# Patient Record
Sex: FEMALE | Race: BLACK OR AFRICAN AMERICAN | Employment: UNEMPLOYED | URBAN - METROPOLITAN AREA
[De-identification: names, ages, dates, MRNs, and addresses within clinical notes are randomized per-mention and may not be internally consistent; named-entity substitution may affect disease eponyms.]

---

## 2017-10-12 ENCOUNTER — TELEPHONE (OUTPATIENT)
Dept: INTERNAL MEDICINE CLINIC | Age: 47
End: 2017-10-12

## 2017-10-12 NOTE — TELEPHONE ENCOUNTER
Nurse calling to report that at 1:00 patients blood pressure was 199/100 today. She took her daily meds at 6:00 am.She seemed to be feeling OK, and told the nurse that it usually runs high when she goes to dialysis, but she didn't do that today. Only on MWF. Also, her right foot is itchy at night, and she itches it until its about raw, and after she does this, she says the bottom of the foot feels numb.       Questions, call nurse Zain at [phone]

## 2017-10-12 NOTE — TELEPHONE ENCOUNTER
[phone]  , patient will be seeing Dr. Jalil Gilmore for the first time on Monday. If any concerns over the weekend they should take her to the ED.

## 2017-10-17 LAB
AVERAGE GLUCOSE: NORMAL
HBA1C MFR BLD: 9.6 %

## 2017-10-19 ENCOUNTER — TELEPHONE (OUTPATIENT)
Dept: INTERNAL MEDICINE CLINIC | Age: 47
End: 2017-10-19

## 2017-11-03 ENCOUNTER — OFFICE VISIT (OUTPATIENT)
Dept: VASCULAR SURGERY | Age: 47
End: 2017-11-03

## 2017-11-03 VITALS
HEIGHT: 60 IN | DIASTOLIC BLOOD PRESSURE: 64 MMHG | BODY MASS INDEX: 43.19 KG/M2 | SYSTOLIC BLOOD PRESSURE: 118 MMHG | WEIGHT: 220 LBS

## 2017-11-03 DIAGNOSIS — N18.6 ESRD (END STAGE RENAL DISEASE) ON DIALYSIS (HCC): Primary | ICD-10-CM

## 2017-11-03 DIAGNOSIS — Z99.2 ESRD (END STAGE RENAL DISEASE) ON DIALYSIS (HCC): Primary | ICD-10-CM

## 2017-11-03 PROCEDURE — 99203 OFFICE O/P NEW LOW 30 MIN: CPT | Performed by: SURGERY

## 2017-11-03 NOTE — PROGRESS NOTES
meals). 6/26/13  Yes Hallie Lara MD   amLODIPine (NORVASC) 10 MG tablet Take 1 tablet by mouth daily. 6/26/13  Yes Hallie Lara MD   lisinopril (PRINIVIL;ZESTRIL) 10 MG tablet Take 10 mg by mouth daily. Yes Historical Provider, MD   clonazePAM (KLONOPIN) 1 MG tablet Take 1 mg by mouth 2 times daily as needed. Yes Historical Provider, MD   aspirin 325 MG tablet Take 325 mg by mouth daily. Yes Historical Provider, MD   sevelamer (RENVELA) 800 MG tablet Take 3 tablets by mouth 3 times daily. Yes Historical Provider, MD   clonidine (CATAPRES) 0.1 MG tablet Take 0.1 mg by mouth 3 times daily. Historical Provider, MD   valsartan (DIOVAN) 80 MG tablet Take 80 mg by mouth 2 times daily. Historical Provider, MD   temazepam (RESTORIL) 30 MG capsule Take 30 mg by mouth nightly as needed. Historical Provider, MD   insulin glargine (LANTUS) 100 UNIT/ML injection Inject 15 Units into the skin nightly. INCREASED DOSE 1/24/12   Gregg Haskins MD        [facility] Meds:    Current Outpatient Prescriptions   Medication Sig Dispense Refill    atorvastatin (LIPITOR) 10 MG tablet 1 tablet by Per NG tube route nightly. 30 tablet 0    carvedilol (COREG) 6.25 MG tablet Take 1 tablet by mouth 2 times daily (with meals). 60 tablet 0    amLODIPine (NORVASC) 10 MG tablet Take 1 tablet by mouth daily. 30 tablet 0    lisinopril (PRINIVIL;ZESTRIL) 10 MG tablet Take 10 mg by mouth daily.  clonazePAM (KLONOPIN) 1 MG tablet Take 1 mg by mouth 2 times daily as needed.  aspirin 325 MG tablet Take 325 mg by mouth daily.  sevelamer (RENVELA) 800 MG tablet Take 3 tablets by mouth 3 times daily.  clonidine (CATAPRES) 0.1 MG tablet Take 0.1 mg by mouth 3 times daily.  valsartan (DIOVAN) 80 MG tablet Take 80 mg by mouth 2 times daily.  temazepam (RESTORIL) 30 MG capsule Take 30 mg by mouth nightly as needed.         insulin glargine (LANTUS) 100 UNIT/ML injection Inject 15 Units into the skin nightly. INCREASED DOSE 2 vial 1     No current facility-administered medications for this visit. Social History:       Social History     Social History    Marital status: Single     Spouse name: N/A    Number of children: N/A    Years of education: N/A     Social History Main Topics    Smoking status: Never Smoker    Smokeless tobacco: Never Used    Alcohol use No    Drug use: No    Sexual activity: Not Asked     Other Topics Concern    None     Social History Narrative    None       Family Histroy:      Family History   Problem Relation Age of Onset    Cancer Mother      ? TYPE    High Blood Pressure Mother     Diabetes Mother     High Blood Pressure Maternal Grandmother     Diabetes Maternal Grandmother     Asthma Other      CHILDREN       Review of Systems:  A 14 point review of systems was completed. Pertinent positives identified in the HPI, all other review of symptoms negative. Physical Exam   Vital Signs: /64   Ht 5' (1.524 m)   Wt 220 lb (99.8 kg)   BMI 42.97 kg/m²        Admission Weight: 220 lb (99.8 kg)     General appearance: alert, appears stated age, cooperative and no distress  Right forearm AV graft with excellent thrill and bruit. Palpable radial pulse. Hand warm. No distal cyanosis or ischemia. No edema noted. Patient's area of concern is slightly tender to palpation. No overlying erythema or aneurysm noted.       Labs:    BMP:   Lab Results   Component Value Date     06/25/2013    K 4.2 06/25/2013    CL 94 06/25/2013    CO2 27 06/25/2013    PHOS 7.1 06/25/2013    BUN 42 06/25/2013    CREATININE 14.3 06/25/2013      No components found for: GLU  Lab Results   Component Value Date    MG 1.6 06/23/2013      CBC:   Lab Results   Component Value Date    WBC 7.6 06/26/2013    HGB 8.7 06/26/2013    HCT 25.4 06/26/2013    MCV 94.2 06/26/2013     06/26/2013      Coagulation:   Lab Results   Component Value Date    INR 1.09

## 2017-12-12 ENCOUNTER — OFFICE VISIT (OUTPATIENT)
Dept: INTERNAL MEDICINE CLINIC | Age: 47
End: 2017-12-12

## 2017-12-12 VITALS
HEART RATE: 78 BPM | DIASTOLIC BLOOD PRESSURE: 80 MMHG | OXYGEN SATURATION: 96 % | BODY MASS INDEX: 42.97 KG/M2 | WEIGHT: 220 LBS | SYSTOLIC BLOOD PRESSURE: 124 MMHG

## 2017-12-12 DIAGNOSIS — I10 ESSENTIAL HYPERTENSION: ICD-10-CM

## 2017-12-12 DIAGNOSIS — I25.10 CORONARY ARTERY DISEASE INVOLVING NATIVE CORONARY ARTERY OF NATIVE HEART WITHOUT ANGINA PECTORIS: ICD-10-CM

## 2017-12-12 DIAGNOSIS — Z00.00 WELL ADULT EXAM: Primary | ICD-10-CM

## 2017-12-12 DIAGNOSIS — E78.49 OTHER HYPERLIPIDEMIA: ICD-10-CM

## 2017-12-12 DIAGNOSIS — N18.6 ESRD (END STAGE RENAL DISEASE) (HCC): ICD-10-CM

## 2017-12-12 DIAGNOSIS — Z23 NEED FOR PROPHYLACTIC VACCINATION AND INOCULATION AGAINST INFLUENZA: ICD-10-CM

## 2017-12-12 PROCEDURE — 90688 IIV4 VACCINE SPLT 0.5 ML IM: CPT | Performed by: INTERNAL MEDICINE

## 2017-12-12 PROCEDURE — 99386 PREV VISIT NEW AGE 40-64: CPT | Performed by: INTERNAL MEDICINE

## 2017-12-12 PROCEDURE — G0008 ADMIN INFLUENZA VIRUS VAC: HCPCS | Performed by: INTERNAL MEDICINE

## 2017-12-12 ASSESSMENT — ENCOUNTER SYMPTOMS
DIARRHEA: 0
COUGH: 0
WHEEZING: 0
NAUSEA: 0
CHEST TIGHTNESS: 0
BACK PAIN: 1
SHORTNESS OF BREATH: 0
CONSTIPATION: 0
ABDOMINAL DISTENTION: 0
VOMITING: 0

## 2017-12-12 NOTE — PROGRESS NOTES
Vaccine Information Sheet, \"Influenza - Inactivated\"  given to Paulina Queen, or parent/legal guardian of  Paulina Queen and verbalized understanding. Patient responses:    Have you ever had a reaction to a flu vaccine? No  Are you able to eat eggs without adverse effects? Yes  Do you have any current illness? No  Have you ever had Guillian Alanson Syndrome? No    Flu vaccine given per order. Please see immunization tab.

## 2017-12-12 NOTE — PROGRESS NOTES
Subjective:      Patient ID: Jorge Barlow is a 52 y.o. female. HPI    Here to establish care and for a well exam. Past Medical History, Medications, Social History, Family History, Health Maintenance  have been reviewed with Jorge Barlow. States her blood sugars have been elevated due to being on prednisone. Followed by Cardiology. Takes cholesterol medication in the evening. htn has improved since starting bp medication. Pt is on transplant list for a kidney. Currently getting HD Monday, Wednesday and Friday        Review of Systems   Constitutional: Negative for unexpected weight change. Respiratory: Negative for cough, chest tightness, shortness of breath and wheezing. Cardiovascular: Negative for chest pain, palpitations and leg swelling. Gastrointestinal: Negative for abdominal distention, constipation, diarrhea, nausea and vomiting. Musculoskeletal: Positive for back pain. Negative for arthralgias and myalgias. Neurological: Negative for tremors and numbness. All other systems reviewed and are negative. Objective:   Physical Exam   Constitutional: She is oriented to person, place, and time. She appears well-developed and well-nourished. No distress. obese   HENT:   Right Ear: External ear normal.   Left Ear: External ear normal.   Nose: Nose normal.   Mouth/Throat: Oropharynx is clear and moist. No oropharyngeal exudate. Neck: Normal range of motion. Neck supple. No thyromegaly present. Cardiovascular: Normal rate, regular rhythm, normal heart sounds and intact distal pulses. Exam reveals no gallop and no friction rub. No murmur heard. Pulmonary/Chest: Effort normal and breath sounds normal. No respiratory distress. She has no wheezes. She has no rales. She exhibits no tenderness. Abdominal: Soft. She exhibits no distension and no mass. There is no tenderness. There is no rebound and no guarding. Musculoskeletal: She exhibits no edema.    Neurological: She is alert and oriented to person, place, and time. Skin: She is not diaphoretic. Psychiatric: She has a normal mood and affect. Her behavior is normal. Judgment and thought content normal.   Vitals reviewed. Assessment:      Mary Vizcarra was seen today for new patient. Diagnoses and all orders for this visit:    Well adult exam  Encouraged exercise and healthy diet. F/u with ob/gyn and dentist regularly. Recommend eye exam.  Wears seat belt. Provided rx for fasting labs. Continue medications. Coronary artery disease involving native coronary artery of native heart without angina pectoris  Stable. F/u with Cardiology    DM (diabetes mellitus), secondary uncontrolled ([facility] [address]  Control unknown. Continue current regimen  -     Hemoglobin A1C; Future    Essential hypertension  Stable. Continue current regimen  -     Comprehensive Metabolic Panel; Future    Other hyperlipidemia  Control unknown. Obtain labs. Continue current regimen  -     Lipid Panel; Future  -     Comprehensive Metabolic Panel;  Future    ESRD (end stage renal disease) ([facility] [address]  On transplant list. Continue HD     Need for prophylactic vaccination and inoculation against influenza  -     INFLUENZA, QUADV, 3 YRS AND OLDER, IM, MDV, 0.5ML ([facility])          Plan:      See above plan

## 2017-12-18 ENCOUNTER — TELEPHONE (OUTPATIENT)
Dept: INTERNAL MEDICINE CLINIC | Age: 47
End: 2017-12-18

## 2017-12-18 NOTE — TELEPHONE ENCOUNTER
[facility] faxed several orders to our office to be signed today - asks to please send back asap

## 2017-12-20 NOTE — TELEPHONE ENCOUNTER
Kenton Gonzáles calling back to say that she faxed the forms over again yesterday to [facility],  and asking if we received them and if they could please be faxed back.

## 2017-12-22 ENCOUNTER — TELEPHONE (OUTPATIENT)
Dept: INTERNAL MEDICINE CLINIC | Age: 47
End: 2017-12-22

## 2017-12-22 NOTE — TELEPHONE ENCOUNTER
Patient called back and I explained the difference between an Office visit and a pre op, and told her that she will need to make an appt for a pre op. Unfortunately, the surgery will have to be rescheduled because its scheduled for next Tuesday, and no time to have a pre op before then. She will call her surgeon to reschedule and will call us back once she has a new surgery date.

## 2018-02-08 ENCOUNTER — TELEPHONE (OUTPATIENT)
Dept: INTERNAL MEDICINE CLINIC | Age: 48
End: 2018-02-08

## 2018-02-08 NOTE — TELEPHONE ENCOUNTER
Home care nurse saw patient at 3:00 today, and her blood pressure was 203/99 - patient said that she had not taken her medication yet - she forgot. She denied dizziness, headaches, blurry vision, etc... And vitals were all stable. Nurse checked it 30 minutes later , and it was 199/100. Patient has dialysis tomorrow, so it will be checked there again.   Questions,  Call nurse Pittman back at  [phone]

## 2018-02-13 ENCOUNTER — TELEPHONE (OUTPATIENT)
Dept: INTERNAL MEDICINE CLINIC | Age: 48
End: 2018-02-13

## 2018-02-13 RX ORDER — SULFAMETHOXAZOLE AND TRIMETHOPRIM 800; 160 MG/1; MG/1
1 TABLET ORAL 2 TIMES DAILY
Qty: 20 TABLET | Refills: 0 | Status: SHIPPED | OUTPATIENT
Start: 2018-02-13 | End: 2018-02-23

## 2018-02-13 NOTE — TELEPHONE ENCOUNTER
[facility], [address] [facility], calling to have Dr. Lilian Spatz call an antibiotic to [facility] [place]. Jonathan Prabhakar had a boil under left breast which has now broken open and oozing a whitish drainage. Had not taken her blood pressure medication as yet therefore it was a bit high but asymptomatic.     Call Zain if any questions -[phone]

## 2018-02-19 ENCOUNTER — TELEPHONE (OUTPATIENT)
Dept: INTERNAL MEDICINE CLINIC | Age: 48
End: 2018-02-19

## 2018-02-19 DIAGNOSIS — L02.91 ABSCESS: Primary | ICD-10-CM

## 2018-02-19 RX ORDER — TRAMADOL HYDROCHLORIDE 50 MG/1
50 TABLET ORAL EVERY 6 HOURS PRN
Qty: 12 TABLET | Refills: 0 | Status: SHIPPED | OUTPATIENT
Start: 2018-02-19 | End: 2018-02-23 | Stop reason: SDUPTHER

## 2018-02-19 NOTE — TELEPHONE ENCOUNTER
Called [facility] to see if she was able to fax us information on that visit.  There was no answer lm for her to return call

## 2018-02-22 ENCOUNTER — TELEPHONE (OUTPATIENT)
Dept: INTERNAL MEDICINE CLINIC | Age: 48
End: 2018-02-22

## 2018-02-22 NOTE — TELEPHONE ENCOUNTER
Patient notified that she needs to be seen for an appt tomorrow per dr Goel Client.  She is going to call transportation and see if they can bring her she will call and let us know

## 2018-02-23 ENCOUNTER — OFFICE VISIT (OUTPATIENT)
Dept: INTERNAL MEDICINE CLINIC | Age: 48
End: 2018-02-23

## 2018-02-23 VITALS
DIASTOLIC BLOOD PRESSURE: 70 MMHG | HEART RATE: 101 BPM | BODY MASS INDEX: 42.18 KG/M2 | WEIGHT: 216 LBS | OXYGEN SATURATION: 93 % | SYSTOLIC BLOOD PRESSURE: 144 MMHG | TEMPERATURE: 99.7 F

## 2018-02-23 DIAGNOSIS — N18.6 ESRD (END STAGE RENAL DISEASE) (HCC): ICD-10-CM

## 2018-02-23 DIAGNOSIS — L02.91 ABSCESS: Primary | ICD-10-CM

## 2018-02-23 DIAGNOSIS — I10 ESSENTIAL HYPERTENSION: ICD-10-CM

## 2018-02-23 DIAGNOSIS — R52 PAIN: ICD-10-CM

## 2018-02-23 PROCEDURE — 99214 OFFICE O/P EST MOD 30 MIN: CPT | Performed by: INTERNAL MEDICINE

## 2018-02-23 RX ORDER — TRAMADOL HYDROCHLORIDE 50 MG/1
50 TABLET ORAL EVERY 6 HOURS PRN
Qty: 12 TABLET | Refills: 0 | Status: SHIPPED | OUTPATIENT
Start: 2018-02-23 | End: 2018-03-07 | Stop reason: SDUPTHER

## 2018-02-23 NOTE — PROGRESS NOTES
kg), SpO2 93 %, not currently breastfeeding. Assessment:     1. Left breast abscess as noted above. 2.  Intolerance to Bactrim  3. DM 2: Baseline  4. HTN: Borderline elevated  5. End-stage renal disease on dialysis: Baseline           Plan:      1. Refilled tramadol p.r.n. for pain prescription printed at her request  2. Rifampin 300 mg 1 p.o. b.i.d. prescription emailed her request  3. Continue with Flagyl  4.   Follow-up with Dr. Carmelo Ott  I spent greater than 25 minutes with this patient face-to-face with greater than 50% involved counseling and care coordination as mentioned above  Dr. pablo

## 2018-03-07 ENCOUNTER — TELEPHONE (OUTPATIENT)
Dept: INTERNAL MEDICINE CLINIC | Age: 48
End: 2018-03-07

## 2018-03-07 DIAGNOSIS — R52 PAIN: ICD-10-CM

## 2018-03-07 DIAGNOSIS — L02.91 ABSCESS: ICD-10-CM

## 2018-03-07 RX ORDER — TRAMADOL HYDROCHLORIDE 50 MG/1
50 TABLET ORAL EVERY 6 HOURS PRN
Qty: 12 TABLET | Refills: 0 | Status: SHIPPED | OUTPATIENT
Start: 2018-03-07 | End: 2018-03-10

## 2018-03-07 NOTE — TELEPHONE ENCOUNTER
Patient need a RX for pull up diapers with diagnosis for incontinence.   [facility] sent to [facility] [address] in [place]

## 2018-03-12 ENCOUNTER — TELEPHONE (OUTPATIENT)
Dept: INTERNAL MEDICINE CLINIC | Age: 48
End: 2018-03-12

## 2018-03-14 ENCOUNTER — OFFICE VISIT (OUTPATIENT)
Dept: INTERNAL MEDICINE CLINIC | Age: 48
End: 2018-03-14

## 2018-03-14 VITALS — SYSTOLIC BLOOD PRESSURE: 120 MMHG | DIASTOLIC BLOOD PRESSURE: 84 MMHG | HEART RATE: 84 BPM | OXYGEN SATURATION: 98 %

## 2018-03-14 DIAGNOSIS — L02.91 ABSCESS: Primary | ICD-10-CM

## 2018-03-14 DIAGNOSIS — I10 ESSENTIAL HYPERTENSION: ICD-10-CM

## 2018-03-14 DIAGNOSIS — E78.49 OTHER HYPERLIPIDEMIA: ICD-10-CM

## 2018-03-14 DIAGNOSIS — N18.6 ESRD (END STAGE RENAL DISEASE) (HCC): ICD-10-CM

## 2018-03-14 PROCEDURE — 1111F DSCHRG MED/CURRENT MED MERGE: CPT | Performed by: INTERNAL MEDICINE

## 2018-03-14 PROCEDURE — 99214 OFFICE O/P EST MOD 30 MIN: CPT | Performed by: INTERNAL MEDICINE

## 2018-03-14 ASSESSMENT — ENCOUNTER SYMPTOMS
SHORTNESS OF BREATH: 0
ABDOMINAL DISTENTION: 0
DIARRHEA: 0
WHEEZING: 0
COUGH: 0
VOMITING: 0
BACK PAIN: 0
NAUSEA: 0
CONSTIPATION: 0
CHEST TIGHTNESS: 0

## 2018-03-14 ASSESSMENT — PATIENT HEALTH QUESTIONNAIRE - PHQ9
SUM OF ALL RESPONSES TO PHQ QUESTIONS 1-9: 0
SUM OF ALL RESPONSES TO PHQ9 QUESTIONS 1 & 2: 0
1. LITTLE INTEREST OR PLEASURE IN DOING THINGS: 0
2. FEELING DOWN, DEPRESSED OR HOPELESS: 0

## 2018-03-14 NOTE — PROGRESS NOTES
Subjective:      Patient ID: Serge Christie is a 52 y.o. female. HPI  Here for hospital f/u for breast abscess and colitis. Pt has a h/o ESRD on HD, htn , dm and hyperlipidemia. I&D performed and pt placed on abx. EGD performed which showed gastric erosions. Pt placed on PPI with no further significant issues of bleeding. Scheduled to see breast specialist.  Emily Duran to have drainage from her left breast. Denies fevers. +pain. N is seeing pt regularly at home. htn has improved since starting bp medication. Lipid control is unknown. Takes her cholesterol medication nightly. States her fasting blood sugars have worsened since being diagnosed with the abscess. Running in the 200s. Review of Systems   Constitutional: Negative for unexpected weight change. Respiratory: Negative for cough, chest tightness, shortness of breath and wheezing. Cardiovascular: Negative for chest pain, palpitations and leg swelling. Gastrointestinal: Negative for abdominal distention, constipation, diarrhea, nausea and vomiting. Genitourinary:        Breast tenderness   Musculoskeletal: Negative for arthralgias, back pain and myalgias. Neurological: Negative for tremors and numbness. All other systems reviewed and are negative. Objective:   Physical Exam   Constitutional: She is oriented to person, place, and time. She appears well-developed and well-nourished. No distress. HENT:   Right Ear: External ear normal.   Left Ear: External ear normal.   Nose: Nose normal.   Mouth/Throat: Oropharynx is clear and moist. No oropharyngeal exudate. Neck: Normal range of motion. Neck supple. No thyromegaly present. Cardiovascular: Normal rate, regular rhythm, normal heart sounds and intact distal pulses. Pulmonary/Chest: Effort normal and breath sounds normal. No respiratory distress. She has no wheezes. She has no rales. She exhibits no tenderness. Abdominal: Soft. She exhibits no distension.  There is no

## 2018-03-16 ENCOUNTER — TELEPHONE (OUTPATIENT)
Dept: INTERNAL MEDICINE CLINIC | Age: 48
End: 2018-03-16

## 2018-03-20 LAB
ALBUMIN SERPL-MCNC: 3.8 G/DL (ref 3.5–5.7)
ALP BLD-CCNC: 63 U/L (ref 36–125)
ALT SERPL-CCNC: 8 U/L (ref 7–52)
ANION GAP SERPL CALCULATED.3IONS-SCNC: 9 MMOL/L (ref 3–16)
AST SERPL-CCNC: 12 U/L (ref 13–39)
BILIRUB SERPL-MCNC: 0.5 MG/DL (ref 0–1.5)
BUN BLDV-MCNC: 15 MG/DL (ref 7–25)
CALCIUM SERPL-MCNC: 9.2 MG/DL (ref 8.6–10.3)
CHLORIDE BLD-SCNC: 99 MMOL/L (ref 98–110)
CHOLESTEROL, TOTAL: 131 MG/DL (ref 0–200)
CO2: 31 MMOL/L (ref 21–33)
CREAT SERPL-MCNC: 8.55 MG/DL (ref 0.6–1.3)
GFR, ESTIMATED: 5 SEE NOTE.
GFR, ESTIMATED: 6 SEE NOTE.
GLUCOSE BLD-MCNC: 181 MG/DL (ref 70–100)
HBA1C MFR BLD: 7.7 % (ref 4.8–6.4)
HDLC SERPL-MCNC: 45 MG/DL (ref 60–92)
LDL CHOLESTEROL CALCULATED: 54 MG/DL
OSMOLALITY CALCULATION: 293 MOSM/KG (ref 278–305)
POTASSIUM SERPL-SCNC: 5.3 MMOL/L (ref 3.5–5.3)
SODIUM BLD-SCNC: 139 MMOL/L (ref 133–146)
TOTAL PROTEIN: 7.2 G/DL (ref 6.4–8.9)
TRIGL SERPL-MCNC: 159 MG/DL (ref 10–149)

## 2018-03-22 ENCOUNTER — TELEPHONE (OUTPATIENT)
Dept: INTERNAL MEDICINE CLINIC | Age: 48
End: 2018-03-22

## 2018-05-22 ENCOUNTER — TELEPHONE (OUTPATIENT)
Dept: INTERNAL MEDICINE CLINIC | Age: 48
End: 2018-05-22

## 2018-05-25 ENCOUNTER — OFFICE VISIT (OUTPATIENT)
Dept: INTERNAL MEDICINE CLINIC | Age: 48
End: 2018-05-25

## 2018-05-25 VITALS — SYSTOLIC BLOOD PRESSURE: 126 MMHG | OXYGEN SATURATION: 98 % | DIASTOLIC BLOOD PRESSURE: 80 MMHG | HEART RATE: 87 BPM

## 2018-05-25 DIAGNOSIS — G62.9 NEUROPATHY: Primary | ICD-10-CM

## 2018-05-25 PROCEDURE — 99213 OFFICE O/P EST LOW 20 MIN: CPT | Performed by: INTERNAL MEDICINE

## 2018-05-25 ASSESSMENT — ENCOUNTER SYMPTOMS
CHEST TIGHTNESS: 0
COUGH: 0
CONSTIPATION: 0
SHORTNESS OF BREATH: 0
DIARRHEA: 0
BACK PAIN: 0
ABDOMINAL DISTENTION: 0
NAUSEA: 0
VOMITING: 0
WHEEZING: 0

## 2018-06-13 ENCOUNTER — TELEPHONE (OUTPATIENT)
Dept: INTERNAL MEDICINE CLINIC | Age: 48
End: 2018-06-13

## 2018-07-11 ENCOUNTER — TELEPHONE (OUTPATIENT)
Dept: INTERNAL MEDICINE CLINIC | Age: 48
End: 2018-07-11

## 2018-07-11 NOTE — TELEPHONE ENCOUNTER
Kamaljit Jung, [address] [place] [address], calling to check on 4 orders that were faxed to the office yesterday. Did we receive these? When can Kamaljit Jung anticipate return?     Phone:  [phone]    Fax:  [phone]

## 2018-07-26 ENCOUNTER — TELEPHONE (OUTPATIENT)
Dept: INTERNAL MEDICINE CLINIC | Age: 48
End: 2018-07-26

## 2018-07-26 NOTE — TELEPHONE ENCOUNTER
Mauro Mcnamara, [address], checking on the Plan of Care and 3 physician orders that need to be signed by Dr. Kaylene Mello as these dates were prior to [facility] passing away. Please fax to Mauro Mcnamara @ [phone].